# Patient Record
Sex: FEMALE | Race: WHITE | NOT HISPANIC OR LATINO | Employment: UNEMPLOYED | URBAN - METROPOLITAN AREA
[De-identification: names, ages, dates, MRNs, and addresses within clinical notes are randomized per-mention and may not be internally consistent; named-entity substitution may affect disease eponyms.]

---

## 2017-02-09 ENCOUNTER — HOSPITAL ENCOUNTER (OUTPATIENT)
Dept: FAMILY MEDICINE CLINIC | Facility: CLINIC | Age: 22
Setting detail: SPECIMEN
Discharge: HOME | End: 2017-02-09
Attending: PHYSICIAN ASSISTANT | Admitting: PHYSICIAN ASSISTANT

## 2017-02-09 ENCOUNTER — CONVERSION ENCOUNTER (OUTPATIENT)
Dept: OTHER | Facility: HOSPITAL | Age: 22
End: 2017-02-09

## 2017-02-09 LAB
ALBUMIN SERPL-MCNC: 3.9 G/DL (ref 3.5–4.8)
ALBUMIN/GLOB SERPL: 1.2 {RATIO} (ref 1–1.7)
ALP SERPL-CCNC: 57 IU/L (ref 32–91)
ALT SERPL-CCNC: 22 IU/L (ref 14–54)
ANION GAP SERPL CALC-SCNC: 9.7 MMOL/L (ref 10–20)
AST SERPL-CCNC: 25 IU/L (ref 15–41)
BASOPHILS # BLD AUTO: 0 10*3/UL (ref 0–0.2)
BASOPHILS NFR BLD AUTO: 1 % (ref 0–2)
BILIRUB SERPL-MCNC: 0.5 MG/DL (ref 0.3–1.2)
BUN SERPL-MCNC: 7 MG/DL (ref 8–20)
BUN/CREAT SERPL: 10 (ref 5.4–26.2)
CALCIUM SERPL-MCNC: 9.3 MG/DL (ref 8.9–10.3)
CHLORIDE SERPL-SCNC: 112 MMOL/L (ref 101–111)
CONV CO2: 21 MMOL/L (ref 22–32)
CONV TOTAL PROTEIN: 7.2 G/DL (ref 6.1–7.9)
CREAT UR-MCNC: 0.7 MG/DL (ref 0.4–1)
DIFFERENTIAL METHOD BLD: (no result)
EOSINOPHIL # BLD AUTO: 0.2 10*3/UL (ref 0–0.3)
EOSINOPHIL # BLD AUTO: 3 % (ref 0–3)
ERYTHROCYTE [DISTWIDTH] IN BLOOD BY AUTOMATED COUNT: 14.4 % (ref 11.5–14.5)
GLOBULIN UR ELPH-MCNC: 3.3 G/DL (ref 2.5–3.8)
GLUCOSE SERPL-MCNC: 96 MG/DL (ref 65–99)
HCT VFR BLD AUTO: 41.1 % (ref 35–49)
HGB BLD-MCNC: 13.6 G/DL (ref 12–15)
LYMPHOCYTES # BLD AUTO: 3 10*3/UL (ref 0.8–4.8)
LYMPHOCYTES NFR BLD AUTO: 44 % (ref 18–42)
MCH RBC QN AUTO: 26.7 PG (ref 26–32)
MCHC RBC AUTO-ENTMCNC: 33.1 G/DL (ref 32–36)
MCV RBC AUTO: 80.6 FL (ref 80–94)
MONOCYTES # BLD AUTO: 0.5 10*3/UL (ref 0.1–1.3)
MONOCYTES NFR BLD AUTO: 7 % (ref 2–11)
NEUTROPHILS # BLD AUTO: 3 10*3/UL (ref 2.3–8.6)
NEUTROPHILS NFR BLD AUTO: 45 % (ref 50–75)
NRBC BLD AUTO-RTO: 0 /100{WBCS}
NRBC/RBC NFR BLD MANUAL: 0 10*3/UL
PLATELET # BLD AUTO: 263 10*3/UL (ref 150–450)
PMV BLD AUTO: 9 FL (ref 7.4–10.4)
POTASSIUM SERPL-SCNC: 3.7 MMOL/L (ref 3.6–5.1)
RBC # BLD AUTO: 5.11 10*6/UL (ref 4–5.4)
SODIUM SERPL-SCNC: 139 MMOL/L (ref 136–144)
WBC # BLD AUTO: 6.8 10*3/UL (ref 4.5–11.5)

## 2017-02-11 ENCOUNTER — HOSPITAL ENCOUNTER (OUTPATIENT)
Dept: FAMILY MEDICINE CLINIC | Facility: CLINIC | Age: 22
Setting detail: SPECIMEN
Discharge: HOME | End: 2017-02-11
Attending: PHYSICIAN ASSISTANT | Admitting: PHYSICIAN ASSISTANT

## 2017-02-11 LAB
BACTERIA SPEC AEROBE CULT: NORMAL
Lab: NORMAL
MICRO REPORT STATUS: NORMAL
SPECIMEN SOURCE: NORMAL

## 2017-02-12 LAB
HEMOCCULT STL QL IA: NEGATIVE
WBC STL QL MICRO: NEGATIVE

## 2017-02-23 ENCOUNTER — CONVERSION ENCOUNTER (OUTPATIENT)
Dept: OTHER | Facility: HOSPITAL | Age: 22
End: 2017-02-23

## 2019-06-04 VITALS
OXYGEN SATURATION: 98 % | DIASTOLIC BLOOD PRESSURE: 76 MMHG | RESPIRATION RATE: 16 BRPM | SYSTOLIC BLOOD PRESSURE: 109 MMHG | HEART RATE: 111 BPM | WEIGHT: 143.6 LBS | DIASTOLIC BLOOD PRESSURE: 76 MMHG | WEIGHT: 142.6 LBS | OXYGEN SATURATION: 99 % | BODY MASS INDEX: 24.86 KG/M2 | BODY MASS INDEX: 25.04 KG/M2 | HEART RATE: 98 BPM | RESPIRATION RATE: 16 BRPM | SYSTOLIC BLOOD PRESSURE: 116 MMHG

## 2021-11-15 ENCOUNTER — ON CAMPUS - OUTPATIENT (OUTPATIENT)
Dept: URBAN - METROPOLITAN AREA HOSPITAL 108 | Facility: HOSPITAL | Age: 26
End: 2021-11-15
Payer: COMMERCIAL

## 2021-11-15 DIAGNOSIS — K64.0 FIRST DEGREE HEMORRHOIDS: ICD-10-CM

## 2021-11-15 DIAGNOSIS — K92.1 MELENA: ICD-10-CM

## 2021-11-15 DIAGNOSIS — K63.5 POLYP OF COLON: ICD-10-CM

## 2021-11-15 PROCEDURE — 45385 COLONOSCOPY W/LESION REMOVAL: CPT | Performed by: INTERNAL MEDICINE

## 2022-01-07 ENCOUNTER — OFFICE (OUTPATIENT)
Dept: URBAN - METROPOLITAN AREA CLINIC 75 | Facility: CLINIC | Age: 27
End: 2022-01-07
Payer: COMMERCIAL

## 2022-01-07 DIAGNOSIS — K64.0 FIRST DEGREE HEMORRHOIDS: ICD-10-CM

## 2022-01-07 PROCEDURE — 46221 LIGATION OF HEMORRHOID(S): CPT | Performed by: INTERNAL MEDICINE

## 2022-01-07 NOTE — SERVICEHPINOTES
27-year-old white female with rectal bleeding.  On recent colonoscopy she did have a small serrated polyp and also internal hemorrhoids.  She presents for hemorrhoid banding.

## 2022-02-01 ENCOUNTER — OFFICE (OUTPATIENT)
Dept: URBAN - METROPOLITAN AREA CLINIC 75 | Facility: CLINIC | Age: 27
End: 2022-02-01
Payer: COMMERCIAL

## 2022-02-01 DIAGNOSIS — K62.5 HEMORRHAGE OF ANUS AND RECTUM: ICD-10-CM

## 2022-02-01 DIAGNOSIS — K64.0 FIRST DEGREE HEMORRHOIDS: ICD-10-CM

## 2022-02-01 DIAGNOSIS — K64.8 OTHER HEMORRHOIDS: ICD-10-CM

## 2022-02-01 PROCEDURE — 46221 LIGATION OF HEMORRHOID(S): CPT | Performed by: INTERNAL MEDICINE

## 2022-02-01 NOTE — SERVICEHPINOTES
27-year-old white female with rectal bleeding.  On recent colonoscopy she did have a small serrated polyp and also internal hemorrhoids.  She presents for her 2nd hemorrhoid banding.

## 2022-02-24 ENCOUNTER — OFFICE (OUTPATIENT)
Dept: URBAN - METROPOLITAN AREA CLINIC 75 | Facility: CLINIC | Age: 27
End: 2022-02-24
Payer: COMMERCIAL

## 2022-02-24 DIAGNOSIS — K64.0 FIRST DEGREE HEMORRHOIDS: ICD-10-CM

## 2022-02-24 DIAGNOSIS — K64.8 OTHER HEMORRHOIDS: ICD-10-CM

## 2022-02-24 DIAGNOSIS — K62.5 HEMORRHAGE OF ANUS AND RECTUM: ICD-10-CM

## 2022-02-24 PROCEDURE — 46221 LIGATION OF HEMORRHOID(S): CPT | Performed by: INTERNAL MEDICINE

## 2022-08-30 ENCOUNTER — OFFICE (OUTPATIENT)
Dept: URBAN - METROPOLITAN AREA CLINIC 75 | Facility: CLINIC | Age: 27
End: 2022-08-30
Payer: COMMERCIAL

## 2022-08-30 VITALS
DIASTOLIC BLOOD PRESSURE: 60 MMHG | WEIGHT: 139 LBS | HEART RATE: 66 BPM | SYSTOLIC BLOOD PRESSURE: 100 MMHG | OXYGEN SATURATION: 97 %

## 2022-08-30 DIAGNOSIS — K64.0 FIRST DEGREE HEMORRHOIDS: ICD-10-CM

## 2022-08-30 DIAGNOSIS — K62.5 HEMORRHAGE OF ANUS AND RECTUM: ICD-10-CM

## 2022-08-30 PROBLEM — K64.8 BLEEDING INTERNAL HEMORRHOIDS: Status: ACTIVE | Noted: 2022-01-07

## 2022-08-30 PROCEDURE — 99213 OFFICE O/P EST LOW 20 MIN: CPT | Performed by: INTERNAL MEDICINE

## 2024-01-02 ENCOUNTER — OFFICE VISIT (OUTPATIENT)
Dept: OBSTETRICS AND GYNECOLOGY | Facility: CLINIC | Age: 29
End: 2024-01-02
Payer: MEDICARE

## 2024-01-02 VITALS
HEIGHT: 64 IN | BODY MASS INDEX: 25.44 KG/M2 | SYSTOLIC BLOOD PRESSURE: 110 MMHG | WEIGHT: 149 LBS | DIASTOLIC BLOOD PRESSURE: 82 MMHG

## 2024-01-02 DIAGNOSIS — Z01.419 ENCOUNTER FOR GYNECOLOGICAL EXAMINATION WITHOUT ABNORMAL FINDING: Primary | ICD-10-CM

## 2024-01-02 RX ORDER — LAMOTRIGINE 100 MG/1
100 TABLET ORAL DAILY
COMMUNITY
Start: 2023-11-20 | End: 2024-11-19

## 2024-01-02 RX ORDER — FERROUS SULFATE 325(65) MG
TABLET ORAL
COMMUNITY
Start: 2023-11-20

## 2024-01-02 RX ORDER — ERGOCALCIFEROL 1.25 MG/1
50000 CAPSULE ORAL WEEKLY
COMMUNITY
Start: 2023-11-20

## 2024-01-02 NOTE — PROGRESS NOTES
GYN Annual Exam     CC- Here for annual exam.  (Patient is here today for an annual exam. Desires pregnancy. )     Melissa Blake is a 28 y.o. female who presents for annual well woman exam. Periods are regular every 28-30 days, lasting a few days. Dysmenorrhea:none. Cyclic symptoms include none. No intermenstrual bleeding, spotting, or discharge.  She recently stopped oral contraceptives which she had been taking since about age 17.  She has had 2 regular menses since going off in 2023    OB History          0    Para   0    Term   0       0    AB   0    Living   0         SAB   0    IAB   0    Ectopic   0    Molar   0    Multiple   0    Live Births   0                Current contraception: none  History of abnormal Pap smear: no  Family history of uterine, colon or ovarian cancer: no  History of abnormal mammogram: no  Family history of breast cancer: no  Last Pap : Uncertain    Past Medical History:   Diagnosis Date    Anxiety     Asthma     Cerebral palsy     mild    Depression        Past Surgical History:   Procedure Laterality Date    COLONOSCOPY           Current Outpatient Medications:     FeroSul 325 (65 Fe) MG tablet, , Disp: , Rfl:     lamoTRIgine (LaMICtal) 100 MG tablet, Take 1 tablet by mouth Daily., Disp: , Rfl:     sertraline (ZOLOFT) 100 MG tablet, Take 1 tablet by mouth Daily., Disp: , Rfl:     vitamin D (ERGOCALCIFEROL) 1.25 MG (55651 UT) capsule capsule, Take 1 capsule by mouth 1 (One) Time Per Week., Disp: , Rfl:     Allergies   Allergen Reactions    Risperidone Other (See Comments)       Social History     Tobacco Use    Smoking status: Never   Vaping Use    Vaping Use: Never used   Substance Use Topics    Alcohol use: Not Currently     Comment: social    Drug use: No       Family History   Problem Relation Age of Onset    Heart disease Father     Hypertension Father     Diabetes Father     Kidney disease Father     Heart disease Paternal Grandmother     Hypertension  "Paternal Grandmother     Diabetes Paternal Grandmother     Kidney disease Paternal Grandmother     Breast cancer Neg Hx     Ovarian cancer Neg Hx     Uterine cancer Neg Hx     Colon cancer Neg Hx        Review of Systems   Constitutional:  Negative for fatigue and fever.   Respiratory:  Negative for cough.    Genitourinary:  Negative for menstrual problem, pelvic pain and pelvic pressure.       /82   Ht 161.3 cm (63.5\")   Wt 67.6 kg (149 lb)   LMP 12/30/2023 (Exact Date)   BMI 25.98 kg/m²     Physical Exam  Constitutional:       Appearance: She is normal weight.   Genitourinary:      Bladder and urethral meatus normal.      No lesions in the vagina.      Right Labia: No lesions.     Left Labia: No lesions.     No vaginal discharge, tenderness or bleeding.      No vaginal prolapse present.     No vaginal atrophy present.       Right Adnexa: not tender, not full and no mass present.     Left Adnexa: not tender, not full and no mass present.     No cervical motion tenderness, discharge, friability or lesion.      Uterus is not enlarged, fixed or tender.      No uterine mass detected.     Uterus is midaxial.   Breasts:     Right: No mass, nipple discharge, skin change or tenderness.      Left: No mass, nipple discharge, skin change or tenderness.   Neck:      Thyroid: No thyroid mass or thyromegaly.   Abdominal:      General: Abdomen is flat.      Palpations: Abdomen is soft. There is no mass.      Tenderness: There is no abdominal tenderness.   Neurological:      Mental Status: She is alert.   Vitals reviewed.               Assessment     1) GYN annual well woman exam.   2) possible desired conception.  We discussed use of prenatal vitamin while attempting conception.  We discussed usual expected times of ovulation.  Her exam is normal today.     Plan     1) Breast Health - Clinical breast exam yearly, Discussed American cancer society recommendations for breast cancer screening, and Self breast awareness " monthly  2) Pap -done with high risk HPV  3) Smoking status-negative  4) Encouraged to be wary of information obtained via social media and internet based on source and search.  5) Follow up prn and one year.       Fernando Kaur MD   1/2/2024  10:10 EST

## 2024-01-04 ENCOUNTER — PATIENT ROUNDING (BHMG ONLY) (OUTPATIENT)
Dept: OBSTETRICS AND GYNECOLOGY | Facility: CLINIC | Age: 29
End: 2024-01-04
Payer: MEDICARE

## 2024-01-04 ENCOUNTER — PATIENT MESSAGE (OUTPATIENT)
Dept: OBSTETRICS AND GYNECOLOGY | Facility: CLINIC | Age: 29
End: 2024-01-04
Payer: MEDICARE

## 2024-01-04 NOTE — PROGRESS NOTES
My chart message has been sent to the patient for PATIENT ROUNDING with [facility].

## 2024-01-05 LAB
CYTOLOGIST CVX/VAG CYTO: NORMAL
CYTOLOGY CVX/VAG DOC CYTO: NORMAL
CYTOLOGY CVX/VAG DOC THIN PREP: NORMAL
DX ICD CODE: NORMAL
HIV 1 & 2 AB SER-IMP: NORMAL
HPV I/H RISK 4 DNA CVX QL PROBE+SIG AMP: NEGATIVE
Lab: NORMAL
OTHER STN SPEC: NORMAL
STAT OF ADQ CVX/VAG CYTO-IMP: NORMAL

## 2024-09-20 ENCOUNTER — OFFICE (AMBULATORY)
Age: 29
End: 2024-09-20

## 2024-09-20 ENCOUNTER — OFFICE (AMBULATORY)
Dept: URBAN - METROPOLITAN AREA CLINIC 76 | Facility: CLINIC | Age: 29
End: 2024-09-20

## 2024-09-20 VITALS
OXYGEN SATURATION: 98 % | OXYGEN SATURATION: 98 % | WEIGHT: 147 LBS | HEART RATE: 81 BPM | HEART RATE: 81 BPM | HEIGHT: 63 IN | DIASTOLIC BLOOD PRESSURE: 74 MMHG | SYSTOLIC BLOOD PRESSURE: 116 MMHG | HEART RATE: 81 BPM | HEIGHT: 63 IN | DIASTOLIC BLOOD PRESSURE: 74 MMHG | SYSTOLIC BLOOD PRESSURE: 116 MMHG | HEIGHT: 63 IN | SYSTOLIC BLOOD PRESSURE: 116 MMHG | HEIGHT: 63 IN | OXYGEN SATURATION: 98 % | HEART RATE: 81 BPM | HEIGHT: 63 IN | OXYGEN SATURATION: 98 % | WEIGHT: 147 LBS | OXYGEN SATURATION: 98 % | SYSTOLIC BLOOD PRESSURE: 116 MMHG | SYSTOLIC BLOOD PRESSURE: 116 MMHG | WEIGHT: 147 LBS | DIASTOLIC BLOOD PRESSURE: 74 MMHG | WEIGHT: 147 LBS | HEART RATE: 81 BPM | HEIGHT: 63 IN | OXYGEN SATURATION: 98 % | DIASTOLIC BLOOD PRESSURE: 74 MMHG | SYSTOLIC BLOOD PRESSURE: 116 MMHG | HEART RATE: 81 BPM | HEIGHT: 63 IN | WEIGHT: 147 LBS | DIASTOLIC BLOOD PRESSURE: 74 MMHG | WEIGHT: 147 LBS | WEIGHT: 147 LBS | OXYGEN SATURATION: 98 % | SYSTOLIC BLOOD PRESSURE: 116 MMHG | DIASTOLIC BLOOD PRESSURE: 74 MMHG | DIASTOLIC BLOOD PRESSURE: 74 MMHG | HEART RATE: 81 BPM

## 2024-09-20 DIAGNOSIS — K64.0 FIRST DEGREE HEMORRHOIDS: ICD-10-CM

## 2024-09-20 DIAGNOSIS — Z86.010 PERSONAL HISTORY OF COLON POLYPS: ICD-10-CM

## 2024-09-20 PROBLEM — K64.8 BLEEDING INTERNAL HEMORRHOIDS: Status: ACTIVE | Noted: 2022-01-07

## 2024-09-20 PROCEDURE — 99214 OFFICE O/P EST MOD 30 MIN: CPT

## 2024-11-22 VITALS
OXYGEN SATURATION: 100 % | DIASTOLIC BLOOD PRESSURE: 58 MMHG | DIASTOLIC BLOOD PRESSURE: 78 MMHG | HEART RATE: 66 BPM | HEART RATE: 71 BPM | HEIGHT: 63 IN | OXYGEN SATURATION: 98 % | DIASTOLIC BLOOD PRESSURE: 58 MMHG | OXYGEN SATURATION: 98 % | WEIGHT: 144 LBS | HEART RATE: 70 BPM | DIASTOLIC BLOOD PRESSURE: 68 MMHG | HEIGHT: 63 IN | DIASTOLIC BLOOD PRESSURE: 68 MMHG | TEMPERATURE: 97.9 F | SYSTOLIC BLOOD PRESSURE: 98 MMHG | HEART RATE: 78 BPM | RESPIRATION RATE: 12 BRPM | TEMPERATURE: 98.6 F | SYSTOLIC BLOOD PRESSURE: 93 MMHG | SYSTOLIC BLOOD PRESSURE: 105 MMHG | DIASTOLIC BLOOD PRESSURE: 78 MMHG | TEMPERATURE: 97.9 F | HEART RATE: 71 BPM | HEART RATE: 70 BPM | DIASTOLIC BLOOD PRESSURE: 63 MMHG | HEART RATE: 66 BPM | HEART RATE: 78 BPM | SYSTOLIC BLOOD PRESSURE: 105 MMHG | SYSTOLIC BLOOD PRESSURE: 105 MMHG | DIASTOLIC BLOOD PRESSURE: 63 MMHG | DIASTOLIC BLOOD PRESSURE: 63 MMHG | RESPIRATION RATE: 15 BRPM | DIASTOLIC BLOOD PRESSURE: 68 MMHG | SYSTOLIC BLOOD PRESSURE: 119 MMHG | DIASTOLIC BLOOD PRESSURE: 61 MMHG | WEIGHT: 144 LBS | OXYGEN SATURATION: 98 % | HEART RATE: 78 BPM | DIASTOLIC BLOOD PRESSURE: 78 MMHG | OXYGEN SATURATION: 99 % | SYSTOLIC BLOOD PRESSURE: 98 MMHG | RESPIRATION RATE: 15 BRPM | HEART RATE: 72 BPM | DIASTOLIC BLOOD PRESSURE: 61 MMHG | TEMPERATURE: 98.6 F | SYSTOLIC BLOOD PRESSURE: 98 MMHG | WEIGHT: 144 LBS | SYSTOLIC BLOOD PRESSURE: 105 MMHG | HEIGHT: 63 IN | DIASTOLIC BLOOD PRESSURE: 61 MMHG | OXYGEN SATURATION: 100 % | DIASTOLIC BLOOD PRESSURE: 61 MMHG | HEART RATE: 70 BPM | SYSTOLIC BLOOD PRESSURE: 119 MMHG | OXYGEN SATURATION: 100 % | SYSTOLIC BLOOD PRESSURE: 105 MMHG | DIASTOLIC BLOOD PRESSURE: 63 MMHG | RESPIRATION RATE: 12 BRPM | TEMPERATURE: 98.6 F | SYSTOLIC BLOOD PRESSURE: 93 MMHG | HEART RATE: 71 BPM | HEART RATE: 66 BPM | WEIGHT: 144 LBS | SYSTOLIC BLOOD PRESSURE: 119 MMHG | SYSTOLIC BLOOD PRESSURE: 101 MMHG | RESPIRATION RATE: 16 BRPM | DIASTOLIC BLOOD PRESSURE: 68 MMHG | DIASTOLIC BLOOD PRESSURE: 78 MMHG | DIASTOLIC BLOOD PRESSURE: 61 MMHG | RESPIRATION RATE: 16 BRPM | OXYGEN SATURATION: 99 % | OXYGEN SATURATION: 100 % | RESPIRATION RATE: 15 BRPM | SYSTOLIC BLOOD PRESSURE: 101 MMHG | OXYGEN SATURATION: 97 % | HEIGHT: 63 IN | HEIGHT: 63 IN | OXYGEN SATURATION: 99 % | HEART RATE: 72 BPM | SYSTOLIC BLOOD PRESSURE: 98 MMHG | OXYGEN SATURATION: 97 % | TEMPERATURE: 97.9 F | DIASTOLIC BLOOD PRESSURE: 61 MMHG | WEIGHT: 144 LBS | SYSTOLIC BLOOD PRESSURE: 98 MMHG | RESPIRATION RATE: 15 BRPM | DIASTOLIC BLOOD PRESSURE: 61 MMHG | TEMPERATURE: 98.6 F | RESPIRATION RATE: 12 BRPM | SYSTOLIC BLOOD PRESSURE: 101 MMHG | OXYGEN SATURATION: 99 % | SYSTOLIC BLOOD PRESSURE: 93 MMHG | HEART RATE: 72 BPM | HEART RATE: 78 BPM | OXYGEN SATURATION: 97 % | RESPIRATION RATE: 12 BRPM | TEMPERATURE: 98.6 F | RESPIRATION RATE: 12 BRPM | RESPIRATION RATE: 12 BRPM | HEART RATE: 66 BPM | DIASTOLIC BLOOD PRESSURE: 63 MMHG | RESPIRATION RATE: 16 BRPM | HEART RATE: 78 BPM | HEART RATE: 71 BPM | RESPIRATION RATE: 15 BRPM | DIASTOLIC BLOOD PRESSURE: 68 MMHG | HEART RATE: 71 BPM | DIASTOLIC BLOOD PRESSURE: 78 MMHG | SYSTOLIC BLOOD PRESSURE: 93 MMHG | DIASTOLIC BLOOD PRESSURE: 58 MMHG | SYSTOLIC BLOOD PRESSURE: 93 MMHG | DIASTOLIC BLOOD PRESSURE: 58 MMHG | SYSTOLIC BLOOD PRESSURE: 105 MMHG | HEART RATE: 78 BPM | OXYGEN SATURATION: 97 % | SYSTOLIC BLOOD PRESSURE: 119 MMHG | OXYGEN SATURATION: 99 % | SYSTOLIC BLOOD PRESSURE: 119 MMHG | HEIGHT: 63 IN | HEART RATE: 72 BPM | SYSTOLIC BLOOD PRESSURE: 93 MMHG | SYSTOLIC BLOOD PRESSURE: 93 MMHG | TEMPERATURE: 97.9 F | DIASTOLIC BLOOD PRESSURE: 78 MMHG | OXYGEN SATURATION: 97 % | OXYGEN SATURATION: 100 % | SYSTOLIC BLOOD PRESSURE: 98 MMHG | HEART RATE: 71 BPM | OXYGEN SATURATION: 98 % | HEART RATE: 78 BPM | TEMPERATURE: 98.6 F | HEART RATE: 70 BPM | SYSTOLIC BLOOD PRESSURE: 101 MMHG | HEART RATE: 71 BPM | OXYGEN SATURATION: 99 % | HEART RATE: 70 BPM | HEART RATE: 72 BPM | DIASTOLIC BLOOD PRESSURE: 68 MMHG | TEMPERATURE: 97.9 F | DIASTOLIC BLOOD PRESSURE: 58 MMHG | OXYGEN SATURATION: 100 % | SYSTOLIC BLOOD PRESSURE: 101 MMHG | TEMPERATURE: 98.6 F | HEART RATE: 72 BPM | SYSTOLIC BLOOD PRESSURE: 101 MMHG | RESPIRATION RATE: 16 BRPM | DIASTOLIC BLOOD PRESSURE: 68 MMHG | SYSTOLIC BLOOD PRESSURE: 119 MMHG | DIASTOLIC BLOOD PRESSURE: 63 MMHG | OXYGEN SATURATION: 100 % | RESPIRATION RATE: 12 BRPM | TEMPERATURE: 97.9 F | SYSTOLIC BLOOD PRESSURE: 98 MMHG | DIASTOLIC BLOOD PRESSURE: 63 MMHG | OXYGEN SATURATION: 98 % | OXYGEN SATURATION: 98 % | WEIGHT: 144 LBS | HEART RATE: 70 BPM | HEART RATE: 66 BPM | OXYGEN SATURATION: 97 % | DIASTOLIC BLOOD PRESSURE: 58 MMHG | RESPIRATION RATE: 15 BRPM | OXYGEN SATURATION: 99 % | RESPIRATION RATE: 15 BRPM | SYSTOLIC BLOOD PRESSURE: 105 MMHG | DIASTOLIC BLOOD PRESSURE: 58 MMHG | TEMPERATURE: 97.9 F | RESPIRATION RATE: 16 BRPM | RESPIRATION RATE: 16 BRPM | HEIGHT: 63 IN | SYSTOLIC BLOOD PRESSURE: 101 MMHG | RESPIRATION RATE: 16 BRPM | DIASTOLIC BLOOD PRESSURE: 78 MMHG | HEART RATE: 66 BPM | HEART RATE: 66 BPM | OXYGEN SATURATION: 98 % | WEIGHT: 144 LBS | HEART RATE: 70 BPM | HEART RATE: 72 BPM | SYSTOLIC BLOOD PRESSURE: 119 MMHG | OXYGEN SATURATION: 97 %

## 2024-11-26 ENCOUNTER — OFFICE (AMBULATORY)
Dept: URBAN - METROPOLITAN AREA PATHOLOGY 4 | Facility: PATHOLOGY | Age: 29
End: 2024-11-26
Payer: MEDICARE

## 2024-11-26 ENCOUNTER — AMBULATORY SURGICAL CENTER (AMBULATORY)
Age: 29
End: 2024-11-26
Payer: MEDICARE

## 2024-11-26 ENCOUNTER — OFFICE (AMBULATORY)
Age: 29
End: 2024-11-26
Payer: MEDICARE

## 2024-11-26 ENCOUNTER — AMBULATORY SURGICAL CENTER (AMBULATORY)
Dept: URBAN - METROPOLITAN AREA SURGERY 17 | Facility: SURGERY | Age: 29
End: 2024-11-26
Payer: MEDICARE

## 2024-11-26 DIAGNOSIS — Z09 ENCOUNTER FOR FOLLOW-UP EXAMINATION AFTER COMPLETED TREATMEN: ICD-10-CM

## 2024-11-26 DIAGNOSIS — K52.9 NONINFECTIVE GASTROENTERITIS AND COLITIS, UNSPECIFIED: ICD-10-CM

## 2024-11-26 DIAGNOSIS — Z86.0102 PERSONAL HISTORY OF HYPERPLASTIC COLON POLYPS: ICD-10-CM

## 2024-11-26 PROBLEM — K63.89 OTHER SPECIFIED DISEASES OF INTESTINE: Status: ACTIVE | Noted: 2024-11-26

## 2024-11-26 LAB
GI HISTOLOGY: A. RANDOM RIGHT COLON: (no result)
GI HISTOLOGY: B. RANDOM LEFT COLON: (no result)
GI HISTOLOGY: PDF REPORT: (no result)

## 2024-11-26 PROCEDURE — 88305 TISSUE EXAM BY PATHOLOGIST: CPT | Performed by: PATHOLOGY

## 2024-11-26 PROCEDURE — 45380 COLONOSCOPY AND BIOPSY: CPT | Mod: PT | Performed by: INTERNAL MEDICINE

## 2024-11-26 NOTE — SERVICEHPINOTES
29-year-old female without GI complaints.  She has a history of hemorrhoids status post banding.  She also has a personal history of serrated polyps and presents for a follow-up colonoscopy. Patient is a 29-year-old female here today in follow-up for rectal bleeding. She feels she has an anal fissure. She has history of cerebral palsy.She has regular bowel movements. She denies any diarrhea and/or constipation. She does not take anything for her bowels. She notes persistent rectal bleeding that actually has slowed down overnight. She feels she has an anal fissure and shows me a picture. To me it appears to be hemorrhoids. She had a colonoscopy in 2021 revealing a potential serrated polyp. Dr. Singh recommended repeat in 4 years. She did undergo hemorrhoid banding and noted that this improved her rectal bleeding for period of time but of course as aforementioned this recurred.She has no reflux or heartburn. She denies dysphagia or melena.I reviewed her most recent lab work as follows Hgb 13.6, Plt 245

## 2024-11-26 NOTE — SERVICEHPINOTES
29-year-old female without GI complaints.  She has a history of hemorrhoids status post banding.  She also has a personal history of serrated polyps and presents for a follow-up colonoscopy. Patient is a 29-year-old female here today in follow-up for rectal bleeding. She feels she has an anal fissure. She has history of cerebral palsy.She has regular bowel movements. She denies any diarrhea and/or constipation. She does not take anything for her bowels. She notes persistent rectal bleeding that actually has slowed down overnight. She feels she has an anal fissure and shows me a picture. To me it appears to be hemorrhoids. She had a colonoscopy in 2021 revealing a potential serrated polyp. Dr. Erazo recommended repeat in 4 years. She did undergo hemorrhoid banding and noted that this improved her rectal bleeding for period of time but of course as aforementioned this recurred.She has no reflux or heartburn. She denies dysphagia or melena.I reviewed her most recent lab work as follows Hgb 13.6, Plt 245

## 2024-11-26 NOTE — SERVICEHPINOTES
29-year-old female without GI complaints.  She has a history of hemorrhoids status post banding.  She also has a personal history of serrated polyps and presents for a follow-up colonoscopy. Patient is a 29-year-old female here today in follow-up for rectal bleeding. She feels she has an anal fissure. She has history of cerebral palsy.She has regular bowel movements. She denies any diarrhea and/or constipation. She does not take anything for her bowels. She notes persistent rectal bleeding that actually has slowed down overnight. She feels she has an anal fissure and shows me a picture. To me it appears to be hemorrhoids. She had a colonoscopy in 2021 revealing a potential serrated polyp. Dr. Mancera recommended repeat in 4 years. She did undergo hemorrhoid banding and noted that this improved her rectal bleeding for period of time but of course as aforementioned this recurred.She has no reflux or heartburn. She denies dysphagia or melena.I reviewed her most recent lab work as follows Hgb 13.6, Plt 245

## 2024-11-26 NOTE — SERVICEHPINOTES
29-year-old female without GI complaints.  She has a history of hemorrhoids status post banding.  She also has a personal history of serrated polyps and presents for a follow-up colonoscopy. Patient is a 29-year-old female here today in follow-up for rectal bleeding. She feels she has an anal fissure. She has history of cerebral palsy.She has regular bowel movements. She denies any diarrhea and/or constipation. She does not take anything for her bowels. She notes persistent rectal bleeding that actually has slowed down overnight. She feels she has an anal fissure and shows me a picture. To me it appears to be hemorrhoids. She had a colonoscopy in 2021 revealing a potential serrated polyp. Dr. Sifuentes recommended repeat in 4 years. She did undergo hemorrhoid banding and noted that this improved her rectal bleeding for period of time but of course as aforementioned this recurred.She has no reflux or heartburn. She denies dysphagia or melena.I reviewed her most recent lab work as follows Hgb 13.6, Plt 245

## 2024-11-26 NOTE — SERVICEHPINOTES
29-year-old female without GI complaints.  She has a history of hemorrhoids status post banding.  She also has a personal history of serrated polyps and presents for a follow-up colonoscopy. Patient is a 29-year-old female here today in follow-up for rectal bleeding. She feels she has an anal fissure. She has history of cerebral palsy.She has regular bowel movements. She denies any diarrhea and/or constipation. She does not take anything for her bowels. She notes persistent rectal bleeding that actually has slowed down overnight. She feels she has an anal fissure and shows me a picture. To me it appears to be hemorrhoids. She had a colonoscopy in 2021 revealing a potential serrated polyp. Dr. San recommended repeat in 4 years. She did undergo hemorrhoid banding and noted that this improved her rectal bleeding for period of time but of course as aforementioned this recurred.She has no reflux or heartburn. She denies dysphagia or melena.I reviewed her most recent lab work as follows Hgb 13.6, Plt 245

## 2024-11-26 NOTE — SERVICEHPINOTES
29-year-old female without GI complaints.  She has a history of hemorrhoids status post banding.  She also has a personal history of serrated polyps and presents for a follow-up colonoscopy. Patient is a 29-year-old female here today in follow-up for rectal bleeding. She feels she has an anal fissure. She has history of cerebral palsy.She has regular bowel movements. She denies any diarrhea and/or constipation. She does not take anything for her bowels. She notes persistent rectal bleeding that actually has slowed down overnight. She feels she has an anal fissure and shows me a picture. To me it appears to be hemorrhoids. She had a colonoscopy in 2021 revealing a potential serrated polyp. Dr. Mckeon recommended repeat in 4 years. She did undergo hemorrhoid banding and noted that this improved her rectal bleeding for period of time but of course as aforementioned this recurred.She has no reflux or heartburn. She denies dysphagia or melena.I reviewed her most recent lab work as follows Hgb 13.6, Plt 245

## 2024-11-26 NOTE — SERVICEHPINOTES
29-year-old female without GI complaints.  She has a history of hemorrhoids status post banding.  She also has a personal history of serrated polyps and presents for a follow-up colonoscopy. Patient is a 29-year-old female here today in follow-up for rectal bleeding. She feels she has an anal fissure. She has history of cerebral palsy.She has regular bowel movements. She denies any diarrhea and/or constipation. She does not take anything for her bowels. She notes persistent rectal bleeding that actually has slowed down overnight. She feels she has an anal fissure and shows me a picture. To me it appears to be hemorrhoids. She had a colonoscopy in 2021 revealing a potential serrated polyp. Dr. Weston recommended repeat in 4 years. She did undergo hemorrhoid banding and noted that this improved her rectal bleeding for period of time but of course as aforementioned this recurred.She has no reflux or heartburn. She denies dysphagia or melena.I reviewed her most recent lab work as follows Hgb 13.6, Plt 245

## 2025-03-21 ENCOUNTER — OFFICE (AMBULATORY)
Dept: URBAN - METROPOLITAN AREA CLINIC 76 | Facility: CLINIC | Age: 30
End: 2025-03-21
Payer: MEDICARE

## 2025-03-21 VITALS
SYSTOLIC BLOOD PRESSURE: 100 MMHG | OXYGEN SATURATION: 98 % | HEIGHT: 63 IN | HEART RATE: 68 BPM | DIASTOLIC BLOOD PRESSURE: 70 MMHG | WEIGHT: 154 LBS

## 2025-03-21 DIAGNOSIS — K64.0 FIRST DEGREE HEMORRHOIDS: ICD-10-CM

## 2025-03-21 PROCEDURE — 99213 OFFICE O/P EST LOW 20 MIN: CPT

## 2025-06-21 ENCOUNTER — HOSPITAL ENCOUNTER (EMERGENCY)
Facility: HOSPITAL | Age: 30
Discharge: HOME | End: 2025-06-21
Attending: EMERGENCY MEDICINE
Payer: OTHER MISCELLANEOUS

## 2025-06-21 ENCOUNTER — APPOINTMENT (OUTPATIENT)
Dept: GENERAL RADIOLOGY | Facility: HOSPITAL | Age: 30
End: 2025-06-21
Payer: OTHER MISCELLANEOUS

## 2025-06-21 VITALS
SYSTOLIC BLOOD PRESSURE: 122 MMHG | OXYGEN SATURATION: 100 % | DIASTOLIC BLOOD PRESSURE: 77 MMHG | TEMPERATURE: 98.2 F | WEIGHT: 152 LBS | BODY MASS INDEX: 26.93 KG/M2 | HEIGHT: 63 IN | HEART RATE: 76 BPM | RESPIRATION RATE: 16 BRPM

## 2025-06-21 DIAGNOSIS — S61.258A CAT BITE OF MIDDLE FINGER, INITIAL ENCOUNTER: Primary | ICD-10-CM
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
## 2025-06-21 PROCEDURE — 73140 X-RAY EXAM OF FINGER(S): CPT

## 2025-06-21 PROCEDURE — 99283 EMERGENCY DEPT VISIT LOW MDM: CPT

## 2025-06-21 RX ORDER — MUPIROCIN 2 %
1 OINTMENT (GRAM) TOPICAL ONCE
Status: COMPLETED | OUTPATIENT
Start: 2025-06-21 | End: 2025-06-21

## 2025-06-21 RX ADMIN — MUPIROCIN 1 APPLICATION: 20 OINTMENT TOPICAL at 00:45

## 2025-06-21 RX ADMIN — AMOXICILLIN AND CLAVULANATE POTASSIUM 1 TABLET: 875; 125 TABLET, COATED ORAL at 00:53

## 2025-06-21 NOTE — ED PROVIDER NOTES
EMERGENCY DEPARTMENT ENCOUNTER  Room Number:  22/22  PCP: Ximena Argueta APRN  Independent Historians: Patient      HPI:  Chief Complaint: Cat bite    A complete HPI/ROS/PMH/PSH/SH/FH are unobtainable due to: None    Chronic or social conditions impacting patient care (Social Determinants of Health): None      Context: Melissa Blake is a 30 y.o. female with a medical history of anxiety who presents to the ED c/o acute cat bite left middle finger and multiple scratches to the left upper extremity.  Patient works at a  office and Was brought in.  Limited history about the cat though it was not stray but had been lost to follow-up so immunization status unknown.  Patient reports some tenderness with ecchymosis overlying the volar surface of the left middle finger proximal phalanx.  Multiple abrasions of the arm as well with no large laceration.  Wound cleansed with saline and iodine prior to arrival.  Tetanus immunization status up-to-date.  Has reportedly been euthanized and sent for further testing.    Review of prior external notes (non-ED) -and- Review of prior external test results outside of this encounter:        PAST MEDICAL HISTORY  Active Ambulatory Problems     Diagnosis Date Noted    Leg pain 08/12/2016     Resolved Ambulatory Problems     Diagnosis Date Noted    Notalgia 08/12/2016     Past Medical History:   Diagnosis Date    Anxiety     Asthma     Cerebral palsy     Depression          PAST SURGICAL HISTORY  Past Surgical History:   Procedure Laterality Date    COLONOSCOPY  2021         FAMILY HISTORY  Family History   Problem Relation Age of Onset    Heart disease Father     Hypertension Father     Diabetes Father     Kidney disease Father     Heart disease Paternal Grandmother     Hypertension Paternal Grandmother     Diabetes Paternal Grandmother     Kidney disease Paternal Grandmother     Breast cancer Neg Hx     Ovarian cancer Neg Hx     Uterine cancer Neg Hx     Colon cancer Neg  Hx          SOCIAL HISTORY  Social History     Socioeconomic History    Marital status: Single   Tobacco Use    Smoking status: Never   Vaping Use    Vaping status: Never Used   Substance and Sexual Activity    Alcohol use: Not Currently     Comment: social    Drug use: No    Sexual activity: Yes     Partners: Male     Birth control/protection: Other         ALLERGIES  Risperidone      REVIEW OF SYSTEMS  Review of Systems  Included in HPI  All systems reviewed and negative except for those discussed in HPI.      PHYSICAL EXAM    I have reviewed the triage vital signs and nursing notes.    ED Triage Vitals   Temp Heart Rate Resp BP SpO2   06/21/25 0005 06/21/25 0005 06/21/25 0005 06/21/25 0011 06/21/25 0005   99.4 °F (37.4 °C) 86 16 120/82 98 %      Temp src Heart Rate Source Patient Position BP Location FiO2 (%)   06/21/25 0005 -- -- -- --   Oral           Physical Exam  Musculoskeletal:        Hands:          Physical Exam   Constitutional: No distress.  Nontoxic  HENT:  Head: Normocephalic and atraumatic.   Oropharynx: Mucous membranes are moist.   Eyes: . No scleral icterus. No conjunctival pallor.  Neck: Normal range of motion. Neck supple.   Cardiovascular: Pink warm and well perfused throughout.    Pulmonary/Chest: No respiratory distress.  No tachypnea or increased work of breathing appreciated.    Musculoskeletal: Moves all extremities equally.  Multiple abrasion's to the right upper extremity with no laceration noted.  Neurological: Alert and oriented.  No acute focal deficit appreciated.  Skin: Skin is pink, warm, and dry.   Psychiatric: Mood and affect normal.   Nursing note and vitals reviewed.             LAB RESULTS  No results found for this or any previous visit (from the past 24 hours).      RADIOLOGY  No Radiology Exams Resulted Within Past 24 Hours      MEDICATIONS GIVEN IN ER  Medications   mupirocin (BACTROBAN) 2 % ointment 1 Application (has no administration in time range)    amoxicillin-clavulanate (AUGMENTIN) 875-125 MG per tablet 1 tablet (has no administration in time range)         ORDERS PLACED DURING THIS VISIT:  Orders Placed This Encounter   Procedures    XR Finger 2+ View Left    Irrigate wound    Wound Dressing         OUTPATIENT MEDICATION MANAGEMENT:  Current Facility-Administered Medications Ordered in Epic   Medication Dose Route Frequency Provider Last Rate Last Admin    amoxicillin-clavulanate (AUGMENTIN) 875-125 MG per tablet 1 tablet  1 tablet Oral Once Joseph Coombs MD        mupirocin (BACTROBAN) 2 % ointment 1 Application  1 Application Topical Once Joseph Coombs MD         Current Outpatient Medications Ordered in Epic   Medication Sig Dispense Refill    amoxicillin-clavulanate (AUGMENTIN) 875-125 MG per tablet Take 1 tablet by mouth 2 (two) times a day. 20 tablet 0    FeroSul 325 (65 Fe) MG tablet       lamoTRIgine (LaMICtal) 100 MG tablet Take 1 tablet by mouth Daily.      sertraline (ZOLOFT) 100 MG tablet Take 1 tablet by mouth Daily.      vitamin D (ERGOCALCIFEROL) 1.25 MG (76205 UT) capsule capsule Take 1 capsule by mouth 1 (One) Time Per Week.           PROCEDURES  Procedures            PROGRESS, DATA ANALYSIS, CONSULTS, AND MEDICAL DECISION MAKING  All labs have been independently interpreted by me.  All radiology studies have been reviewed by me. All EKGs have been independently viewed and interpreted by me.  Discussion below represents my analysis of pertinent findings related to patient's condition, differential diagnosis, treatment plan and final disposition.    Differential diagnosis:       Clinical Scores:                  ED Course as of 06/21/25 0047   Sat Jun 21, 2025   0035 Cat is being studied for rabies thus patient does not require commitment to rabies vaccination series at this time.  Close outpatient follow-up with that result is recommended.  Patient at high risk for secondary infection from cat bite thus will initiate oral antibiotics  and discussed red flags which indicate need for ED return.  Patient will be advised to follow-up with hand surgery in short order for further wound care.  Patient agreeable discharge planning. [RS]   0047 RADIOLOGY      Study: Left fingers  Findings: No retained radiopaque foreign body identified nor fracture identified.  I independently viewed and interpreted these images contemporaneously with treatment.    [RS]      ED Course User Index  [RS] Joseph Coombs MD         Prescription drug monitoring program review:     AS OF 00:47 EDT VITALS:    BP - 120/82  HR - 86  TEMP - 99.4 °F (37.4 °C) (Oral)  O2 SATS - 98%    COMPLEXITY OF CARE  Admission was considered but after careful review of the patient's presentation, physical examination, diagnostic results, and response to treatment the patient may be safely discharged with outpatient follow-up.      DIAGNOSIS  Final diagnoses:   Cat bite of middle finger, initial encounter         DISPOSITION  ED Disposition       ED Disposition   Discharge    Condition   Stable    Comment   --                  DISCHARGE    Patient discharged in stable condition.    Reviewed implications of results, diagnosis, meds, responsibility to follow up, warning signs and symptoms of possible worsening, potential complications and reasons to return to ER.    Patient/Family voiced understanding of above instructions.    Discussed plan for discharge, as there is no emergent indication for admission. Patient referred to primary care provider for regular health maintenance. Pt/family is agreeable and understands need for follow up and possible repeat testing.  Pt is aware that discharge does not mean that nothing is wrong but it indicates no emergency is present that requires admission and they must continue care with follow-up as given below or physician of their choice.     FOLLOW-UP  Ximena Argueta, APRN  [address] [id number] [phone]    Schedule an appointment as  soon as possible for a visit   As needed    Huan Butler MD  [address]  [address]  [facility] [id number] [phone]    Schedule an appointment as soon as possible for a visit in 3 days  For wound re-check         Medication List        New Prescriptions      amoxicillin-clavulanate 875-125 MG per tablet  Commonly known as: AUGMENTIN  Take 1 tablet by mouth 2 (two) times a day.               Where to Get Your Medications        These medications were sent to [id number] - [place], [place] - [address] [facility] - [phone]  - [phone] FX  [address] [facility] [address]      Phone: [phone]   amoxicillin-clavulanate 875-125 MG per tablet           Please note that portions of this document were completed with a voice recognition program.    Note Disclaimer: At [facility], we believe that sharing information builds trust and better relationships. You are receiving this note because you recently visited [facility]. It is possible you will see health information before a provider has talked with you about it. This kind of information can be easy to misunderstand. To help you fully understand what it means for your health, we urge you to discuss this note with your provider.         Joseph Coombs MD  06/21/25 0047